# Patient Record
Sex: MALE | Race: WHITE | NOT HISPANIC OR LATINO | Employment: OTHER | URBAN - METROPOLITAN AREA
[De-identification: names, ages, dates, MRNs, and addresses within clinical notes are randomized per-mention and may not be internally consistent; named-entity substitution may affect disease eponyms.]

---
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
## 2022-06-02 ENCOUNTER — HOSPITAL ENCOUNTER (INPATIENT)
Facility: CLINIC | Age: 22
LOS: 1 days | Discharge: HOME | End: 2022-06-03
Attending: EMERGENCY MEDICINE | Admitting: STUDENT IN AN ORGANIZED HEALTH CARE EDUCATION/TRAINING PROGRAM
Payer: COMMERCIAL

## 2022-06-02 ENCOUNTER — APPOINTMENT (OUTPATIENT)
Dept: GENERAL RADIOLOGY | Facility: CLINIC | Age: 22
End: 2022-06-02
Attending: EMERGENCY MEDICINE
Payer: COMMERCIAL

## 2022-06-02 DIAGNOSIS — E10.10 DIABETIC KETOACIDOSIS WITHOUT COMA ASSOCIATED WITH TYPE 1 DIABETES MELLITUS (H): ICD-10-CM

## 2022-06-02 DIAGNOSIS — R07.9 CHEST PAIN, UNSPECIFIED TYPE: ICD-10-CM

## 2022-06-02 LAB
ALBUMIN SERPL-MCNC: 5 G/DL (ref 3.4–5)
ALBUMIN UR-MCNC: 30 MG/DL
ALP SERPL-CCNC: 252 U/L (ref 65–260)
ALT SERPL W P-5'-P-CCNC: 32 U/L (ref 0–50)
ANION GAP SERPL CALCULATED.3IONS-SCNC: 12 MMOL/L (ref 3–14)
ANION GAP SERPL CALCULATED.3IONS-SCNC: 20 MMOL/L (ref 3–14)
ANION GAP SERPL CALCULATED.3IONS-SCNC: 6 MMOL/L (ref 3–14)
ANION GAP SERPL CALCULATED.3IONS-SCNC: 7 MMOL/L (ref 3–14)
APPEARANCE UR: CLEAR
AST SERPL W P-5'-P-CCNC: 18 U/L (ref 0–35)
ATRIAL RATE - MUSE: 68 BPM
BASOPHILS # BLD AUTO: 0.1 10E3/UL (ref 0–0.2)
BASOPHILS # BLD AUTO: 0.1 10E3/UL (ref 0–0.2)
BASOPHILS NFR BLD AUTO: 0 %
BASOPHILS NFR BLD AUTO: 0 %
BILIRUB SERPL-MCNC: 0.8 MG/DL (ref 0.2–1.3)
BILIRUB UR QL STRIP: NEGATIVE
BUN SERPL-MCNC: 16 MG/DL (ref 7–30)
BUN SERPL-MCNC: 16 MG/DL (ref 7–30)
BUN SERPL-MCNC: 17 MG/DL (ref 7–30)
BUN SERPL-MCNC: 23 MG/DL (ref 7–30)
CALCIUM SERPL-MCNC: 8.6 MG/DL (ref 8.5–10.1)
CALCIUM SERPL-MCNC: 8.7 MG/DL (ref 8.5–10.1)
CALCIUM SERPL-MCNC: 9 MG/DL (ref 8.5–10.1)
CALCIUM SERPL-MCNC: 9.9 MG/DL (ref 8.5–10.1)
CHLORIDE BLD-SCNC: 101 MMOL/L (ref 98–110)
CHLORIDE BLD-SCNC: 103 MMOL/L (ref 98–110)
CHLORIDE BLD-SCNC: 103 MMOL/L (ref 98–110)
CHLORIDE BLD-SCNC: 97 MMOL/L (ref 98–110)
CO2 SERPL-SCNC: 19 MMOL/L (ref 20–32)
CO2 SERPL-SCNC: 22 MMOL/L (ref 20–32)
CO2 SERPL-SCNC: 24 MMOL/L (ref 20–32)
CO2 SERPL-SCNC: 28 MMOL/L (ref 20–32)
COLOR UR AUTO: ABNORMAL
CREAT SERPL-MCNC: 0.65 MG/DL (ref 0.5–1)
CREAT SERPL-MCNC: 0.72 MG/DL (ref 0.5–1)
CREAT SERPL-MCNC: 0.72 MG/DL (ref 0.5–1)
CREAT SERPL-MCNC: 0.92 MG/DL (ref 0.5–1)
DIASTOLIC BLOOD PRESSURE - MUSE: NORMAL MMHG
EOSINOPHIL # BLD AUTO: 0 10E3/UL (ref 0–0.7)
EOSINOPHIL # BLD AUTO: 0 10E3/UL (ref 0–0.7)
EOSINOPHIL NFR BLD AUTO: 0 %
EOSINOPHIL NFR BLD AUTO: 0 %
ERYTHROCYTE [DISTWIDTH] IN BLOOD BY AUTOMATED COUNT: 11.7 % (ref 10–15)
ERYTHROCYTE [DISTWIDTH] IN BLOOD BY AUTOMATED COUNT: 11.8 % (ref 10–15)
GFR SERPL CREATININE-BSD FRML MDRD: >90 ML/MIN/1.73M2
GLUCOSE BLD-MCNC: 136 MG/DL (ref 70–99)
GLUCOSE BLD-MCNC: 234 MG/DL (ref 70–99)
GLUCOSE BLD-MCNC: 253 MG/DL (ref 70–99)
GLUCOSE BLD-MCNC: 479 MG/DL (ref 70–99)
GLUCOSE BLDC GLUCOMTR-MCNC: 135 MG/DL (ref 70–99)
GLUCOSE BLDC GLUCOMTR-MCNC: 140 MG/DL (ref 70–99)
GLUCOSE BLDC GLUCOMTR-MCNC: 143 MG/DL (ref 70–99)
GLUCOSE BLDC GLUCOMTR-MCNC: 151 MG/DL (ref 70–99)
GLUCOSE BLDC GLUCOMTR-MCNC: 169 MG/DL (ref 70–99)
GLUCOSE BLDC GLUCOMTR-MCNC: 177 MG/DL (ref 70–99)
GLUCOSE BLDC GLUCOMTR-MCNC: 210 MG/DL (ref 70–99)
GLUCOSE BLDC GLUCOMTR-MCNC: 227 MG/DL (ref 70–99)
GLUCOSE BLDC GLUCOMTR-MCNC: 230 MG/DL (ref 70–99)
GLUCOSE BLDC GLUCOMTR-MCNC: 235 MG/DL (ref 70–99)
GLUCOSE BLDC GLUCOMTR-MCNC: 239 MG/DL (ref 70–99)
GLUCOSE BLDC GLUCOMTR-MCNC: 258 MG/DL (ref 70–99)
GLUCOSE BLDC GLUCOMTR-MCNC: 277 MG/DL (ref 70–99)
GLUCOSE BLDC GLUCOMTR-MCNC: 278 MG/DL (ref 70–99)
GLUCOSE BLDC GLUCOMTR-MCNC: 315 MG/DL (ref 70–99)
GLUCOSE BLDC GLUCOMTR-MCNC: 331 MG/DL (ref 70–99)
GLUCOSE BLDC GLUCOMTR-MCNC: 436 MG/DL (ref 70–99)
GLUCOSE UR STRIP-MCNC: >=1000 MG/DL
HBA1C MFR BLD: 10.2 % (ref 0–5.6)
HCO3 BLDV-SCNC: 20 MMOL/L (ref 21–28)
HCT VFR BLD AUTO: 42.3 % (ref 40–53)
HCT VFR BLD AUTO: 49.4 % (ref 40–53)
HGB BLD-MCNC: 14.4 G/DL (ref 13.3–17.7)
HGB BLD-MCNC: 16.5 G/DL (ref 13.3–17.7)
HGB UR QL STRIP: NEGATIVE
HOLD SPECIMEN: NORMAL
HOLD SPECIMEN: NORMAL
IMM GRANULOCYTES # BLD: 0.1 10E3/UL
IMM GRANULOCYTES # BLD: 0.1 10E3/UL
IMM GRANULOCYTES NFR BLD: 0 %
IMM GRANULOCYTES NFR BLD: 1 %
INTERPRETATION ECG - MUSE: NORMAL
KETONES BLD-SCNC: 1 MMOL/L (ref 0–0.6)
KETONES BLD-SCNC: 2.3 MMOL/L (ref 0–0.6)
KETONES BLD-SCNC: 4.3 MMOL/L (ref 0–0.6)
KETONES BLD-SCNC: 6.1 MMOL/L (ref 0–0.6)
KETONES UR STRIP-MCNC: >150 MG/DL
LACTATE BLD-SCNC: 1.5 MMOL/L
LEUKOCYTE ESTERASE UR QL STRIP: NEGATIVE
LYMPHOCYTES # BLD AUTO: 1.5 10E3/UL (ref 0.8–5.3)
LYMPHOCYTES # BLD AUTO: 2 10E3/UL (ref 0.8–5.3)
LYMPHOCYTES NFR BLD AUTO: 16 %
LYMPHOCYTES NFR BLD AUTO: 9 %
MAGNESIUM SERPL-MCNC: 1.9 MG/DL (ref 1.6–2.3)
MAGNESIUM SERPL-MCNC: 2.5 MG/DL (ref 1.6–2.3)
MCH RBC QN AUTO: 29.6 PG (ref 26.5–33)
MCH RBC QN AUTO: 30 PG (ref 26.5–33)
MCHC RBC AUTO-ENTMCNC: 33.4 G/DL (ref 31.5–36.5)
MCHC RBC AUTO-ENTMCNC: 34 G/DL (ref 31.5–36.5)
MCV RBC AUTO: 87 FL (ref 78–100)
MCV RBC AUTO: 90 FL (ref 78–100)
MONOCYTES # BLD AUTO: 0.9 10E3/UL (ref 0–1.3)
MONOCYTES # BLD AUTO: 0.9 10E3/UL (ref 0–1.3)
MONOCYTES NFR BLD AUTO: 5 %
MONOCYTES NFR BLD AUTO: 7 %
MUCOUS THREADS #/AREA URNS LPF: PRESENT /LPF
NEUTROPHILS # BLD AUTO: 15.4 10E3/UL (ref 1.6–8.3)
NEUTROPHILS # BLD AUTO: 9.6 10E3/UL (ref 1.6–8.3)
NEUTROPHILS NFR BLD AUTO: 77 %
NEUTROPHILS NFR BLD AUTO: 85 %
NITRATE UR QL: NEGATIVE
NRBC # BLD AUTO: 0 10E3/UL
NRBC # BLD AUTO: 0 10E3/UL
NRBC BLD AUTO-RTO: 0 /100
NRBC BLD AUTO-RTO: 0 /100
OSMOLALITY SERPL: 302 MMOL/KG (ref 275–295)
P AXIS - MUSE: 39 DEGREES
PCO2 BLDV: 48 MM HG (ref 40–50)
PH BLDV: 7.22 [PH] (ref 7.32–7.43)
PH UR STRIP: 5.5 [PH] (ref 5–7)
PHOSPHATE SERPL-MCNC: 5.6 MG/DL (ref 2.5–4.5)
PLATELET # BLD AUTO: 297 10E3/UL (ref 150–450)
PLATELET # BLD AUTO: 361 10E3/UL (ref 150–450)
PO2 BLDV: 44 MM HG (ref 25–47)
POTASSIUM BLD-SCNC: 3.6 MMOL/L (ref 3.4–5.3)
POTASSIUM BLD-SCNC: 3.9 MMOL/L (ref 3.4–5.3)
POTASSIUM BLD-SCNC: 4.2 MMOL/L (ref 3.4–5.3)
POTASSIUM BLD-SCNC: 4.3 MMOL/L (ref 3.4–5.3)
POTASSIUM BLD-SCNC: 4.4 MMOL/L (ref 3.4–5.3)
PR INTERVAL - MUSE: 162 MS
PROT SERPL-MCNC: 8.5 G/DL (ref 6.8–8.8)
QRS DURATION - MUSE: 96 MS
QT - MUSE: 406 MS
QTC - MUSE: 431 MS
R AXIS - MUSE: 86 DEGREES
RBC # BLD AUTO: 4.86 10E6/UL (ref 4.4–5.9)
RBC # BLD AUTO: 5.5 10E6/UL (ref 4.4–5.9)
RBC URINE: 0 /HPF
SAO2 % BLDV: 70 % (ref 94–100)
SARS-COV-2 RNA RESP QL NAA+PROBE: NEGATIVE
SODIUM SERPL-SCNC: 134 MMOL/L (ref 133–144)
SODIUM SERPL-SCNC: 135 MMOL/L (ref 133–144)
SODIUM SERPL-SCNC: 136 MMOL/L (ref 133–144)
SODIUM SERPL-SCNC: 137 MMOL/L (ref 133–144)
SP GR UR STRIP: 1.03 (ref 1–1.03)
SQUAMOUS EPITHELIAL: <1 /HPF
SYSTOLIC BLOOD PRESSURE - MUSE: NORMAL MMHG
T AXIS - MUSE: 42 DEGREES
TROPONIN I SERPL HS-MCNC: <3 NG/L
UROBILINOGEN UR STRIP-MCNC: NORMAL MG/DL
VENTRICULAR RATE- MUSE: 68 BPM
WBC # BLD AUTO: 12.5 10E3/UL (ref 4–11)
WBC # BLD AUTO: 17.9 10E3/UL (ref 4–11)
WBC URINE: 1 /HPF

## 2022-06-02 PROCEDURE — U0003 INFECTIOUS AGENT DETECTION BY NUCLEIC ACID (DNA OR RNA); SEVERE ACUTE RESPIRATORY SYNDROME CORONAVIRUS 2 (SARS-COV-2) (CORONAVIRUS DISEASE [COVID-19]), AMPLIFIED PROBE TECHNIQUE, MAKING USE OF HIGH THROUGHPUT TECHNOLOGIES AS DESCRIBED BY CMS-2020-01-R: HCPCS | Performed by: EMERGENCY MEDICINE

## 2022-06-02 PROCEDURE — 258N000003 HC RX IP 258 OP 636: Performed by: NURSE PRACTITIONER

## 2022-06-02 PROCEDURE — 250N000009 HC RX 250: Performed by: NURSE PRACTITIONER

## 2022-06-02 PROCEDURE — 82803 BLOOD GASES ANY COMBINATION: CPT

## 2022-06-02 PROCEDURE — 250N000009 HC RX 250: Performed by: EMERGENCY MEDICINE

## 2022-06-02 PROCEDURE — 82010 KETONE BODYS QUAN: CPT | Performed by: EMERGENCY MEDICINE

## 2022-06-02 PROCEDURE — 83735 ASSAY OF MAGNESIUM: CPT | Performed by: NURSE PRACTITIONER

## 2022-06-02 PROCEDURE — 250N000011 HC RX IP 250 OP 636: Performed by: EMERGENCY MEDICINE

## 2022-06-02 PROCEDURE — 120N000013 HC R&B IMCU

## 2022-06-02 PROCEDURE — 80053 COMPREHEN METABOLIC PANEL: CPT | Performed by: EMERGENCY MEDICINE

## 2022-06-02 PROCEDURE — 84484 ASSAY OF TROPONIN QUANT: CPT | Performed by: EMERGENCY MEDICINE

## 2022-06-02 PROCEDURE — 96366 THER/PROPH/DIAG IV INF ADDON: CPT

## 2022-06-02 PROCEDURE — C9803 HOPD COVID-19 SPEC COLLECT: HCPCS

## 2022-06-02 PROCEDURE — 83735 ASSAY OF MAGNESIUM: CPT | Performed by: EMERGENCY MEDICINE

## 2022-06-02 PROCEDURE — 258N000003 HC RX IP 258 OP 636: Performed by: EMERGENCY MEDICINE

## 2022-06-02 PROCEDURE — 82010 KETONE BODYS QUAN: CPT | Performed by: NURSE PRACTITIONER

## 2022-06-02 PROCEDURE — 99223 1ST HOSP IP/OBS HIGH 75: CPT | Mod: AI | Performed by: NURSE PRACTITIONER

## 2022-06-02 PROCEDURE — 81001 URINALYSIS AUTO W/SCOPE: CPT | Performed by: NURSE PRACTITIONER

## 2022-06-02 PROCEDURE — 96365 THER/PROPH/DIAG IV INF INIT: CPT | Mod: 59

## 2022-06-02 PROCEDURE — 84132 ASSAY OF SERUM POTASSIUM: CPT | Performed by: NURSE PRACTITIONER

## 2022-06-02 PROCEDURE — 99285 EMERGENCY DEPT VISIT HI MDM: CPT | Mod: 25

## 2022-06-02 PROCEDURE — 84100 ASSAY OF PHOSPHORUS: CPT | Performed by: EMERGENCY MEDICINE

## 2022-06-02 PROCEDURE — 85004 AUTOMATED DIFF WBC COUNT: CPT | Performed by: NURSE PRACTITIONER

## 2022-06-02 PROCEDURE — 87040 BLOOD CULTURE FOR BACTERIA: CPT | Performed by: NURSE PRACTITIONER

## 2022-06-02 PROCEDURE — 84132 ASSAY OF SERUM POTASSIUM: CPT | Performed by: EMERGENCY MEDICINE

## 2022-06-02 PROCEDURE — 36415 COLL VENOUS BLD VENIPUNCTURE: CPT | Performed by: NURSE PRACTITIONER

## 2022-06-02 PROCEDURE — 36415 COLL VENOUS BLD VENIPUNCTURE: CPT | Performed by: EMERGENCY MEDICINE

## 2022-06-02 PROCEDURE — 96375 TX/PRO/DX INJ NEW DRUG ADDON: CPT

## 2022-06-02 PROCEDURE — 83605 ASSAY OF LACTIC ACID: CPT

## 2022-06-02 PROCEDURE — 82040 ASSAY OF SERUM ALBUMIN: CPT | Performed by: EMERGENCY MEDICINE

## 2022-06-02 PROCEDURE — 93005 ELECTROCARDIOGRAM TRACING: CPT

## 2022-06-02 PROCEDURE — 83036 HEMOGLOBIN GLYCOSYLATED A1C: CPT | Performed by: EMERGENCY MEDICINE

## 2022-06-02 PROCEDURE — 83930 ASSAY OF BLOOD OSMOLALITY: CPT | Performed by: NURSE PRACTITIONER

## 2022-06-02 PROCEDURE — 85014 HEMATOCRIT: CPT | Performed by: EMERGENCY MEDICINE

## 2022-06-02 PROCEDURE — 71046 X-RAY EXAM CHEST 2 VIEWS: CPT

## 2022-06-02 RX ORDER — DEXTROSE MONOHYDRATE 25 G/50ML
25-50 INJECTION, SOLUTION INTRAVENOUS
Status: DISCONTINUED | OUTPATIENT
Start: 2022-06-02 | End: 2022-06-03

## 2022-06-02 RX ORDER — SODIUM CHLORIDE AND POTASSIUM CHLORIDE 150; 450 MG/100ML; MG/100ML
INJECTION, SOLUTION INTRAVENOUS CONTINUOUS
Status: DISCONTINUED | OUTPATIENT
Start: 2022-06-02 | End: 2022-06-03

## 2022-06-02 RX ORDER — NICOTINE POLACRILEX 4 MG
15-30 LOZENGE BUCCAL
Status: DISCONTINUED | OUTPATIENT
Start: 2022-06-02 | End: 2022-06-03

## 2022-06-02 RX ORDER — POTASSIUM CHLORIDE 7.45 MG/ML
10 INJECTION INTRAVENOUS ONCE
Status: COMPLETED | OUTPATIENT
Start: 2022-06-02 | End: 2022-06-02

## 2022-06-02 RX ORDER — DEXTROSE MONOHYDRATE, SODIUM CHLORIDE, AND POTASSIUM CHLORIDE 50; 1.49; 4.5 G/1000ML; G/1000ML; G/1000ML
INJECTION, SOLUTION INTRAVENOUS CONTINUOUS
Status: DISCONTINUED | OUTPATIENT
Start: 2022-06-02 | End: 2022-06-03

## 2022-06-02 RX ORDER — ONDANSETRON 2 MG/ML
8 INJECTION INTRAMUSCULAR; INTRAVENOUS ONCE
Status: COMPLETED | OUTPATIENT
Start: 2022-06-02 | End: 2022-06-02

## 2022-06-02 RX ADMIN — Medication 0.5 UNITS/HR: at 13:40

## 2022-06-02 RX ADMIN — POTASSIUM CHLORIDE 10 MEQ: 7.46 INJECTION, SOLUTION INTRAVENOUS at 08:18

## 2022-06-02 RX ADMIN — SODIUM CHLORIDE, POTASSIUM CHLORIDE, SODIUM LACTATE AND CALCIUM CHLORIDE 1000 ML: 600; 310; 30; 20 INJECTION, SOLUTION INTRAVENOUS at 08:12

## 2022-06-02 RX ADMIN — Medication: at 21:58

## 2022-06-02 RX ADMIN — SODIUM CHLORIDE, POTASSIUM CHLORIDE, SODIUM LACTATE AND CALCIUM CHLORIDE 1000 ML: 600; 310; 30; 20 INJECTION, SOLUTION INTRAVENOUS at 08:24

## 2022-06-02 RX ADMIN — Medication 4.5 UNITS/HR: at 08:47

## 2022-06-02 RX ADMIN — ONDANSETRON 8 MG: 2 INJECTION INTRAMUSCULAR; INTRAVENOUS at 08:28

## 2022-06-02 RX ADMIN — POTASSIUM CHLORIDE, DEXTROSE MONOHYDRATE AND SODIUM CHLORIDE: 150; 5; 450 INJECTION, SOLUTION INTRAVENOUS at 14:16

## 2022-06-02 ASSESSMENT — ENCOUNTER SYMPTOMS
DIARRHEA: 0
SHORTNESS OF BREATH: 1
ABDOMINAL PAIN: 1
COUGH: 0
NAUSEA: 1

## 2022-06-02 ASSESSMENT — ACTIVITIES OF DAILY LIVING (ADL)
ADLS_ACUITY_SCORE: 35

## 2022-06-02 NOTE — ED TRIAGE NOTES
"  Pt began vomiting last night with abdominal pain, CP and SOB. Type 1 DM. Hx DKA, has had to be hospitalized in the past. Glucose 350-400 on home pump, ketone strips at home \"at highest level.\" ABCs intact.      "

## 2022-06-02 NOTE — H&P
[facility]    History and Physical - Hospitalist Service       Date of Admission:  6/2/2022    Assessment & Plan   Pawel Alston is a 21 year old male with a past medical history of type 1 diabetes, who uses an insulin pump, and is followed by Dr. Qureshi with [facility] who presents to the emergency department for evaluation of abdominal pain, nausea and vomiting.  He was in his usual state of health until this morning at 0100 he woke with nausea, epigastric abdominal pain and upper chest pain/shortness of breath.  He describes the pain to be a burning sensation and is constant in duration.  Pain is worsened with movement and is relieved with rest.  When he woke up this morning at 0600 hrs. he he noted that his battery on the insulin pump was no longer functioning (last known working at 2130 hrs. on 6/1/2022).  He obtained a fingerstick glucose and it read high blood sugar.  He took a ketone test that too was high.  He called his primary provider who recommended they come into the emergency department because the issue was too critical to ignore or try to manage at home.     Acute medical issues:  DKA  Chest/Abdominal pain  Nausea/vomiting  T1DM, poorly controlled (HgA1C 10.2):  Tobacco use disorder  THC use    Plan:  -- [place] [facility] [place]  -- Insulin gtt.  IVF hydration. Replace elytes  -- Diabetic education/reinforcement  -- Check UA. CXR reassuring.  Etiology -- equipment failure and poor attendance to equipment (long standing history -- he should be charging the battery when showering at night).  Does not appear to have an infectious etiology.  -- Pain management and antiemetics.    -- Serial labs Q4hrs.   -- He vapes.  Offered nicotine replacement.  He declines.  Advised non-smoking status.  Endorses recreational use of THC.  Recommend cessation.         Diet:  NPO for now. IVF. Replace elytes.  DVT Prophylaxis: Mechanical   Jolly Catheter:  Not present  Central Lines: None  Cardiac Monitoring: None  Code Status:   Full       Disposition Plan   Expected Discharge: pending clinical course         The patient's care was discussed with the Attending Physician, Dr. Hoyos, Bedside Nurse, Patient, Patient's Family and EM Team.    [facility]  Hospitalist Service  [facility]  Securely message with the Vocera Web Console (learn more here)  Text page via shopa Paging/Directory         ______________________________________________________________________    Chief Complaint   Abdominal pain    History is obtained from the patient  Collateral information is obtained from the emergency medicine service, the EMR, and his mother.    History of Present Illness   Pawel Alston is a 21 year old male with a past medical history of type 1 diabetes, who uses an insulin pump, and is followed by Dr. Qureshi with [facility] who presents to the emergency department for evaluation of abdominal pain, nausea and vomiting.  He was in his usual state of health until this morning at 0100 he woke with nausea, epigastric abdominal pain and upper chest pain/shortness of breath.  He describes the pain to be a burning sensation and is constant in duration.  Pain is worsened with movement and is relieved with rest.  When he woke up this morning at 0600 hrs. he he noted that his battery on the insulin pump was no longer functioning (last known working at 2130 hrs. on 6/1/2022).  He obtained a fingerstick glucose and it read high blood sugar.  He took a ketone test that 2 was high.  He called his primary provider who recommended they come into the emergency department because the issue was too critical to ignore or try to manage at home.  Since the pain has started his symptoms have improved.  He denies any fever, cough, diarrhea, hematochezia, hematemesis or hemoptysis.  No recent travel.  No known exposure to sick  contacts.  No recent illness.    COVID status: COVID PCR is negative.    ED course: IV, IV fluids, labs, imaging, and treatment.  Pertinent findings: Beta hydroxybutyrate of 6.1, lactic acid of 1.5, bicarbonate of 20, and venous pH of 7.22.  Leukocytosis with a white count of 17.9 thousand, hemoglobin of 16.5 platelets of 361,000.  Electrolyte panel was reassuring.  Glucose of 479.  LFTs are normal.  Phosphorus and magnesium were mildly elevated.  A1c of 10.2 (recently was 11 and several months ago as high as 14).  COVID PCR is negative.  Urinalysis is pending.  Checks x-ray was reviewed and no airspace disease or trauma is noted.  Pertinent treatment: 2 L of LR, Zofran, potassium chloride 10 mg, and insulin drip.    Baseline insulin pump: Basal rate 1 unit/h regular insulin total of 24 units.  Carb ratio: 12 g, correction factor: 50 mg/dL, blood glucose target: 12  mg/dL, 7  mg/dL; active insulin time: 3 hours.    Review of Systems    12 point ROS as above. Otherwise negative.     Past Medical History    I have reviewed this patient's medical history and updated it with pertinent information if needed.   Past Medical History:   Diagnosis Date     Type 1 diabetes mellitus (H)     dx at age 9, uses insulin pump.  Dr Qureshi at [facility] is primary Endo   He has required hospitalization 1 other time for DKA.  Has never required intubation.  Attention deficit disorder    Past Surgical History   I have reviewed this patient's surgical history and updated it with pertinent information if needed.  Past Surgical History:   Procedure Laterality Date     OPEN REDUCTION INTERNAL FIXATION WRIST Left 11/8/2015    Procedure: OPEN REDUCTION INTERNAL FIXATION WRIST;  Surgeon: Amador Ro MD;  Location:  OR       Social History   I have reviewed this patient's social history and updated it with pertinent information if needed.  EtOH: Denies significant EtOH use.         Family History   Sister with T1DM.  Father  with colon cancer    Prior to Admission Medications   Prior to Admission Medications   Prescriptions Last Dose Informant Patient Reported? Taking?   INSULIN PUMP - OUTPATIENT   Yes No   Sig: Inject 0.65 Units/hr Subcutaneous Date last updated:  11/8/15  Medtronic Minimed: Model   Insulin lispro (Humalog)  BASAL RATES and times:  12   AM (midnight): 0.7 units/hour    0400: 0.75 units/hour  1000: 0.6 units/hour  2000: 0.6 units/hour  Bolus: 1 unit per 10 g CHO Breakfast; 1 unit per 12 g CHO Lunch and Dinner   [facility]link / [employer] username:    [employer] / Yu[employer] Password:   oxyCODONE (ROXICODONE) 5 MG immediate release tablet   No No   Sig: Take 1 tablet (5 mg) by mouth every 4 hours as needed for moderate to severe pain   polyethylene glycol (MIRALAX/GLYCOLAX) packet   No No   Sig: Take 17 g by mouth daily as needed for constipation      Facility-Administered Medications: None     Allergies   No Known Allergies    Physical Exam   Vital Signs: Temp: 97.6  F (36.4  C) Temp src: Temporal BP: 117/79 Pulse: 83   Resp: 18 SpO2: 100 % O2 Device: None (Room air)    Weight: 165 lbs 1.99 oz    General Appearance: Pleasant 21-year-old male no acute distress.  HEENT: Normocephalic atraumatic.  Pupils equal round react to light accommodation.  EOMI.  Oropharynx is clear.  Respiratory: Bilateral breath sounds clear to auscultation.  No adventitious breath sounds are noted.  Cardiovascular: Regular rate and rhythm S1-S2 no S3 or S4.  GI: Soft, mild tenderness.  Normoactive bowel sounds.  No significant rebound.  Lymph/Hematologic: No lymphadenopathy  Genitourinary: Deferred  Skin: Warm dry and intact.  No worrisome lesions..  Musculoskeletal: Moves all extremities.  CMS is intact.  Capillary refill less than 3 seconds.  Neurologic: Alert and oriented x3.  Follows commands.  No focal deficits.  Psychiatric: Calm    Data   Data reviewed today: I reviewed all medications, new labs and imaging results over the last 24 hours. I personally  reviewed     Recent Labs   Lab 06/02/22  1131 06/02/22  1103 06/02/22  1039 06/02/22  0952 06/02/22  0847 06/02/22  0738   WBC  --   --   --   --   --  17.9*   HGB  --   --   --   --   --  16.5   MCV  --   --   --   --   --  90   PLT  --   --   --   --   --  361   NA  --   --   --   --   --  136   POTASSIUM  --   --  4.3  --   --  4.4   CHLORIDE  --   --   --   --   --  97*   CO2  --   --   --   --   --  19*   BUN  --   --   --   --   --  23   CR  --   --   --   --   --  0.92   ANIONGAP  --   --   --   --   --  20*   RERE  --   --   --   --   --  9.9   * 177*  --  278*   < > 479*   ALBUMIN  --   --   --   --   --  5.0   PROTTOTAL  --   --   --   --   --  8.5   BILITOTAL  --   --   --   --   --  0.8   ALKPHOS  --   --   --   --   --  252   ALT  --   --   --   --   --  32   AST  --   --   --   --   --  18    < > = values in this interval not displayed.     Recent Results (from the past 24 hour(s))   Chest XR,  PA & LAT    Narrative    CHEST TWO VIEWS  6/2/2022 8:37 AM     HISTORY: Chest pain.    COMPARISON: None.      Impression    IMPRESSION: No acute cardiopulmonary disease.

## 2022-06-02 NOTE — ED NOTES
[facility]  ED Nurse Handoff Report    Pawel Alston is a 21 year old male   ED Chief complaint: Abdominal Pain  . ED Diagnosis:   Final diagnoses:   Diabetic ketoacidosis without coma associated with type 1 diabetes mellitus (H)   Chest pain, unspecified type     Allergies: No Known Allergies    Code Status: Full Code  Activity level - Baseline/Home:  Independent. Activity Level - Current:   Independent. Lift room needed: No. Bariatric: No   Needed: No   Isolation: No. Infection: Not Applicable.     Vital Signs:   Vitals:    06/02/22 0720 06/02/22 0750 06/02/22 0800 06/02/22 0810   BP: 136/86 117/73 117/79    Pulse: 82  83    Resp: 18      Temp: 97.6  F (36.4  C)      TempSrc: Temporal      SpO2: 99% 100% 100% 100%   Weight: 74.9 kg (165 lb 2 oz)          Cardiac Rhythm:  ,      Pain level:    Patient confused: No. Patient Falls Risk: Yes.   Elimination Status: Not in ED  Patient Report - Initial Complaint: Abdominal Pain. Focused Assessment:  Pawel Alston is a 21 year old male with history of diabetes who presents with abdominal pain. The patient states that this morning at 0100 he woke up with nausea, epigastric abdominal pain, shortness of breath, and pain in his upper chest. He describes this as a burning sensation and constant. He notes the pain is worse with movement and is better but still there while resting. When he woke up this morning at 0600 he found his glucose pump to be out of battery for an unknown amount of time (last known working at 2130 on 6/1). They read a high blood sugar and also a very high ketone test. The patient called his primary doctor who said to come into the ED because the issue was to severe too fix at home.  He notes the symptoms have gotten better since the pain started. He denies any cough, diarrhea, or hemoptysis. He denies a history of blood clots in his legs or lungs. He denies any recent travel.      Review of Systems    Respiratory: Positive for shortness of breath. Negative for cough.    Cardiovascular: Positive for chest pain.   Gastrointestinal: Positive for abdominal pain and nausea. Negative for diarrhea.   All other systems reviewed and are negative.  Tests Performed: EKG, Labs, Imaging . Abnormal Results:   Labs Ordered and Resulted from Time of ED Arrival to Time of ED Departure   COMPREHENSIVE METABOLIC PANEL - Abnormal       Result Value    Sodium 136      Potassium 4.4      Chloride 97 (*)     Carbon Dioxide (CO2) 19 (*)     Anion Gap 20 (*)     Urea Nitrogen 23      Creatinine 0.92      Calcium 9.9      Glucose 479 (*)     Alkaline Phosphatase 252      AST 18      ALT 32      Protein Total 8.5      Albumin 5.0      Bilirubin Total 0.8      GFR Estimate >90     KETONE BETA-HYDROXYBUTYRATE QUANTITATIVE, RAPID - Abnormal    Ketone (Beta-Hydroxybutyrate) Quantitative 6.1 (*)    MAGNESIUM - Abnormal    Magnesium 2.5 (*)    GLUCOSE BY METER - Abnormal    GLUCOSE BY METER POCT 436 (*)    CBC WITH PLATELETS AND DIFFERENTIAL - Abnormal    WBC Count 17.9 (*)     RBC Count 5.50      Hemoglobin 16.5      Hematocrit 49.4      MCV 90      MCH 30.0      MCHC 33.4      RDW 11.7      Platelet Count 361      % Neutrophils 85      % Lymphocytes 9      % Monocytes 5      % Eosinophils 0      % Basophils 0      % Immature Granulocytes 1      NRBCs per 100 WBC 0      Absolute Neutrophils 15.4 (*)     Absolute Lymphocytes 1.5      Absolute Monocytes 0.9      Absolute Eosinophils 0.0      Absolute Basophils 0.1      Absolute Immature Granulocytes 0.1      Absolute NRBCs 0.0     ISTAT GASES LACTATE VENOUS POCT - Abnormal    Lactic Acid POCT 1.5      Bicarbonate Venous POCT 20 (*)     O2 Sat, Venous POCT 70 (*)     pCO2V Venous POCT 48      pH Venous POCT 7.22 (*)     pO2 Venous POCT 44     HEMOGLOBIN A1C - Abnormal    Hemoglobin A1C 10.2 (*)    PHOSPHORUS - Abnormal    Phosphorus 5.6 (*)    GLUCOSE BY METER - Abnormal    GLUCOSE BY METER POCT  331 (*)    TROPONIN I - Normal    Troponin I High Sensitivity <3     GLUCOSE MONITOR NURSING POCT   GLUCOSE MONITOR NURSING POCT   COVID-19 VIRUS (CORONAVIRUS) BY PCR     Chest XR,  PA & LAT   Preliminary Result   IMPRESSION: No acute cardiopulmonary disease.        .   Treatments provided: See MAR   Family Comments: Mother at bedside   OBS brochure/video discussed/provided to patient:  No  ED Medications:   Medications   lactated ringers BOLUS 1,000 mL (1,000 mLs Intravenous New Bag 6/2/22 0812)   dextrose 50 % injection 25-50 mL (has no administration in time range)   potassium chloride 10 mEq in 100 mL sterile water intermittent infusion (premix) (10 mEq Intravenous New Bag 6/2/22 0818)   insulin 1 unit/1 mL in NS (NovoLIN, HumuLIN Regular) drip -ED DKA algorithm (4.5 Units/hr Intravenous New Bag 6/2/22 0847)   lactated ringers BOLUS 1,000 mL (1,000 mLs Intravenous New Bag 6/2/22 0824)   ondansetron (ZOFRAN) injection 8 mg (8 mg Intravenous Given 6/2/22 0828)     Drips infusing:  Yes  For the majority of the shift, the patient's behavior Green. Interventions performed were N/A.    Sepsis treatment initiated: No     Patient tested for COVID 19 prior to admission: YES    ED Nurse Name/Phone Number: Alcira Carcamo RN,   9:09 AM  RECEIVING UNIT ED HANDOFF REVIEW    Above ED Nurse Handoff Report was reviewed: Yes  Reviewed by: Marlyn Perry RN on June 2, 2022 at 12:18 PM

## 2022-06-02 NOTE — PHARMACY-ADMISSION MEDICATION HISTORY
Admission medication history interview status for this patient is complete. See [facility] admission navigator for allergy information, prior to admission medications and immunization status.     Medication history interview done, indicate source(s): Patient & mother  Medication history resources (including written lists, pill bottles, clinic record): [facility] endocrinology note 5/25/22 in care everywhere  Pharmacy: [facility]    Changes made to PTA medication list:  Added: none  Changed: insulin pump settings  Reported as Not Taking: none  Removed: oxycodone, miralax    Actions taken by pharmacist (provider contacted, etc):None     Additional medication history information:    **Unable to find current pump basal settings in physical pump. Updated list per endocrinology note on 5/25/22. Pump is currently attached but paused.         Medication reconciliation/reorder completed by provider prior to medication history?  N   (Y/N)     For patients on insulin therapy:   See insulin pump settings.  Would refer to last endocrinology note from [facility] on 5/25/22.       Prior to Admission medications    Medication Sig Last Dose Taking? Auth Provider   INSULIN PUMP - OUTPATIENT Date last updated:  6/2/22  Current Diabetes Medications: NovoLog via TandemX2 insulin pump  Basal rates: 1 unit per hour x 24 hours (total 24 units)  Carb ratio: 12 grams  Correction factor: 50 mg/dL  BG target: 12 a.m. 140 mg/dL and 7 a.m. 130 mg/dL mg/dL  Active insulin time: 3 hours 6/2/2022 at [facility], paused Yes Unknown, Entered By History

## 2022-06-02 NOTE — ED PROVIDER NOTES
History   Chief Complaint:  Abdominal Pain       HPI   Pawel Alston is a 21 year old male with history of diabetes who presents with abdominal pain. The patient states that this morning at 0100 he woke up with nausea, epigastric abdominal pain, shortness of breath, and pain in his upper chest. He describes this as a burning sensation and constant. He notes the pain is worse with movement and is better but still there while resting. When he woke up this morning at 0600 he found his glucose pump to be out of battery for an unknown amount of time (last known working at 2130 on 6/1). They read a high blood sugar and also a very high ketone test. The patient called his primary doctor who said to come into the ED because the issue was to severe too fix at home.  He notes the symptoms have gotten better since the pain started. He denies any cough, diarrhea, or hemoptysis. He denies a history of blood clots in his legs or lungs. He denies any recent travel.     Review of Systems   Respiratory: Positive for shortness of breath. Negative for cough.    Cardiovascular: Positive for chest pain.   Gastrointestinal: Positive for abdominal pain and nausea. Negative for diarrhea.   All other systems reviewed and are negative.    Allergies:  The patient has no known allergies.     Medications:  oxyCODONE  polyethylene glycol     Past Medical History:     Diabetes  ADD  Lipohypertrophy  DKA    Past Surgical History:    Open reduction internal fixation wrist     Family History:    Father-Colon cancer  Sister-Diabetes    Social History:  The patient presents to the ED with his mom.  The patient works in [employer].    Physical Exam     Patient Vitals for the past 24 hrs:   BP Temp Temp src Pulse Resp SpO2 Weight   06/02/22 0910 -- -- -- 62 18 99 % --   06/02/22 0900 119/62 -- -- 77 19 100 % --   06/02/22 0810 -- -- -- -- -- 100 % --   06/02/22 0800 117/79 -- -- 83 -- 100 % --   06/02/22 0750 117/73 -- -- -- -- 100 % --    06/02/22 0720 136/86 97.6  F (36.4  C) Temporal 82 18 99 % 74.9 kg (165 lb 2 oz)       Physical Exam    Eyes:    Conjunctiva normal  Neck:    Supple, no meningismus.     CV:     Regular rate and rhythm.      No murmurs, rubs or gallops.       No unilateral leg swelling.       2+ radial pulses bilateral.       No lower extremity edema.  PULM:    Clear to auscultation bilateral.       No respiratory distress.      Good air exchange.     No rales or wheezing.  ABD:    Soft, non-tender, non-distended.       No pulsatile masses.       No rebound, guarding or rigidity.  MSK:     No gross deformity to all four extremities.   LYMPH:   No cervical lymphadenopathy.  NEURO:   Alert. Good muscle tone, no atrophy.  Skin:    Warm, dry and intact.    Psych:    Mood is good and affect is appropriate.        Emergency Department Course   ECG  This ECG was taken at 0746, and signed at 0751  Sinus rhythm with marked sinus arrhythmia  Otherwise normal ECG  Vent. rate 68, OR interval 162, QRS Duration 96, QT/QTc 406/431, P-R-T axes 39 86 42      Imaging:  Chest XR,  PA & LAT   Preliminary Result   IMPRESSION: No acute cardiopulmonary disease.        Report per radiology    Laboratory:  Labs Ordered and Resulted from Time of ED Arrival to Time of ED Departure   COMPREHENSIVE METABOLIC PANEL - Abnormal       Result Value    Sodium 136      Potassium 4.4      Chloride 97 (*)     Carbon Dioxide (CO2) 19 (*)     Anion Gap 20 (*)     Urea Nitrogen 23      Creatinine 0.92      Calcium 9.9      Glucose 479 (*)     Alkaline Phosphatase 252      AST 18      ALT 32      Protein Total 8.5      Albumin 5.0      Bilirubin Total 0.8      GFR Estimate >90     KETONE BETA-HYDROXYBUTYRATE QUANTITATIVE, RAPID - Abnormal    Ketone (Beta-Hydroxybutyrate) Quantitative 6.1 (*)    MAGNESIUM - Abnormal    Magnesium 2.5 (*)    GLUCOSE BY METER - Abnormal    GLUCOSE BY METER POCT 436 (*)    CBC WITH PLATELETS AND DIFFERENTIAL - Abnormal    WBC Count 17.9 (*)      RBC Count 5.50      Hemoglobin 16.5      Hematocrit 49.4      MCV 90      MCH 30.0      MCHC 33.4      RDW 11.7      Platelet Count 361      % Neutrophils 85      % Lymphocytes 9      % Monocytes 5      % Eosinophils 0      % Basophils 0      % Immature Granulocytes 1      NRBCs per 100 WBC 0      Absolute Neutrophils 15.4 (*)     Absolute Lymphocytes 1.5      Absolute Monocytes 0.9      Absolute Eosinophils 0.0      Absolute Basophils 0.1      Absolute Immature Granulocytes 0.1      Absolute NRBCs 0.0     ISTAT GASES LACTATE VENOUS POCT - Abnormal    Lactic Acid POCT 1.5      Bicarbonate Venous POCT 20 (*)     O2 Sat, Venous POCT 70 (*)     pCO2V Venous POCT 48      pH Venous POCT 7.22 (*)     pO2 Venous POCT 44     HEMOGLOBIN A1C - Abnormal    Hemoglobin A1C 10.2 (*)    PHOSPHORUS - Abnormal    Phosphorus 5.6 (*)    GLUCOSE BY METER - Abnormal    GLUCOSE BY METER POCT 331 (*)    TROPONIN I - Normal    Troponin I High Sensitivity <3     GLUCOSE MONITOR NURSING POCT   GLUCOSE MONITOR NURSING POCT   COVID-19 VIRUS (CORONAVIRUS) BY PCR      Emergency Department Course:         Reviewed:  I reviewed nursing notes, vitals, past medical history and Care Everywhere    Assessments:  0726 I obtained history and examined the patient as noted above.   0814 I rechecked the patient and explained findings.     Consults:  0836 Talked with Dr Hoyos about patients symptoms and admittance.    Interventions:  0812 Bolus 1000mL IV  0818 Potassium chloride 10mEq IV  0824 Bolus 1000mL IV  0828 Zofran 8mg IV    Disposition:  The patient was admitted to the hospital under the care of Dr. Hoyos.     Impression & Plan       Medical Decision Makin-year-old male presents the ED with chest pain, epigastric pain and nausea/vomiting after his insulin pump ran out of power.  Patient was unfortunately found to have DKA without significant electrolyte abnormality.  Patient was given IV fluids and ultimately started on insulin after return  of basic laboratory studies.  Blood sugars improved with IV fluids alone prior to onset of an insulin infusion.  Patient's native insulin pump was discontinued.  No clinical evidence for associated infection.  Patient will require admission for further treatment of DKA.    In regards to his chest pain, EKG without dysrhythmia or ischemic changes.  Chest x-ray unremarkable.  Troponin within normal limits.  He has no features concerning for pulmonary embolism. I believe chest pain is likely related to DKA and potentially degree of esophagitis from recurrent vomiting this morning.    Diagnosis:    ICD-10-CM    1. Diabetic ketoacidosis without coma associated with type 1 diabetes mellitus (H)  E10.10    2. Chest pain, unspecified type  R07.9        Discharge Medications:  New Prescriptions    No medications on file       Scribe Disclosure:  Ham ZARATE, am serving as a scribe at 7:26 AM on 6/2/2022 to document services personally performed by Alexis Billings MD based on my observations and the provider's statements to me.              Alexis Billings MD  06/02/22 0932

## 2022-06-02 NOTE — PLAN OF CARE
Goal Outcome Evaluation:    Plan of Care Reviewed With: patient, mother     Patient admitted to floor and surroundings.Diabetic with personal insulin pump. Awoke to feeling ill and his pumps batteries were drained. Patient admitted with ketoacidosis and placed on a insulin drip. He has started food but has a sore throat. He is alert, oriented, up independently and mom is at the bedside. Continue POC.

## 2022-06-03 VITALS
TEMPERATURE: 97.6 F | SYSTOLIC BLOOD PRESSURE: 113 MMHG | WEIGHT: 169.6 LBS | HEART RATE: 63 BPM | OXYGEN SATURATION: 100 % | DIASTOLIC BLOOD PRESSURE: 67 MMHG | RESPIRATION RATE: 18 BRPM

## 2022-06-03 LAB
ANION GAP SERPL CALCULATED.3IONS-SCNC: 4 MMOL/L (ref 3–14)
ANION GAP SERPL CALCULATED.3IONS-SCNC: 5 MMOL/L (ref 3–14)
BUN SERPL-MCNC: 12 MG/DL (ref 7–30)
BUN SERPL-MCNC: 15 MG/DL (ref 7–30)
CALCIUM SERPL-MCNC: 8.6 MG/DL (ref 8.5–10.1)
CALCIUM SERPL-MCNC: 8.6 MG/DL (ref 8.5–10.1)
CHLORIDE BLD-SCNC: 105 MMOL/L (ref 98–110)
CHLORIDE BLD-SCNC: 105 MMOL/L (ref 98–110)
CO2 SERPL-SCNC: 27 MMOL/L (ref 20–32)
CO2 SERPL-SCNC: 29 MMOL/L (ref 20–32)
CREAT SERPL-MCNC: 0.67 MG/DL (ref 0.5–1)
CREAT SERPL-MCNC: 0.72 MG/DL (ref 0.5–1)
GFR SERPL CREATININE-BSD FRML MDRD: >90 ML/MIN/1.73M2
GFR SERPL CREATININE-BSD FRML MDRD: >90 ML/MIN/1.73M2
GLUCOSE BLD-MCNC: 232 MG/DL (ref 70–99)
GLUCOSE BLD-MCNC: 234 MG/DL (ref 70–99)
GLUCOSE BLDC GLUCOMTR-MCNC: 116 MG/DL (ref 70–99)
GLUCOSE BLDC GLUCOMTR-MCNC: 141 MG/DL (ref 70–99)
GLUCOSE BLDC GLUCOMTR-MCNC: 160 MG/DL (ref 70–99)
GLUCOSE BLDC GLUCOMTR-MCNC: 168 MG/DL (ref 70–99)
GLUCOSE BLDC GLUCOMTR-MCNC: 171 MG/DL (ref 70–99)
GLUCOSE BLDC GLUCOMTR-MCNC: 185 MG/DL (ref 70–99)
GLUCOSE BLDC GLUCOMTR-MCNC: 191 MG/DL (ref 70–99)
GLUCOSE BLDC GLUCOMTR-MCNC: 203 MG/DL (ref 70–99)
GLUCOSE BLDC GLUCOMTR-MCNC: 205 MG/DL (ref 70–99)
GLUCOSE BLDC GLUCOMTR-MCNC: 222 MG/DL (ref 70–99)
GLUCOSE BLDC GLUCOMTR-MCNC: 229 MG/DL (ref 70–99)
GLUCOSE BLDC GLUCOMTR-MCNC: 230 MG/DL (ref 70–99)
GLUCOSE BLDC GLUCOMTR-MCNC: 254 MG/DL (ref 70–99)
GLUCOSE BLDC GLUCOMTR-MCNC: 296 MG/DL (ref 70–99)
GLUCOSE BLDC GLUCOMTR-MCNC: 335 MG/DL (ref 70–99)
KETONES BLD-SCNC: 0.2 MMOL/L (ref 0–0.6)
KETONES BLD-SCNC: 0.4 MMOL/L (ref 0–0.6)
PHOSPHATE SERPL-MCNC: 2.8 MG/DL (ref 2.5–4.5)
POTASSIUM BLD-SCNC: 3.5 MMOL/L (ref 3.4–5.3)
POTASSIUM BLD-SCNC: 4.1 MMOL/L (ref 3.4–5.3)
SODIUM SERPL-SCNC: 136 MMOL/L (ref 133–144)
SODIUM SERPL-SCNC: 139 MMOL/L (ref 133–144)

## 2022-06-03 PROCEDURE — 80048 BASIC METABOLIC PNL TOTAL CA: CPT | Performed by: NURSE PRACTITIONER

## 2022-06-03 PROCEDURE — 82010 KETONE BODYS QUAN: CPT | Performed by: NURSE PRACTITIONER

## 2022-06-03 PROCEDURE — 84100 ASSAY OF PHOSPHORUS: CPT | Performed by: NURSE PRACTITIONER

## 2022-06-03 PROCEDURE — 250N000009 HC RX 250

## 2022-06-03 PROCEDURE — 36415 COLL VENOUS BLD VENIPUNCTURE: CPT | Performed by: NURSE PRACTITIONER

## 2022-06-03 PROCEDURE — 99239 HOSP IP/OBS DSCHRG MGMT >30: CPT | Performed by: STUDENT IN AN ORGANIZED HEALTH CARE EDUCATION/TRAINING PROGRAM

## 2022-06-03 RX ORDER — DEXTROSE MONOHYDRATE 25 G/50ML
25-50 INJECTION, SOLUTION INTRAVENOUS
Status: DISCONTINUED | OUTPATIENT
Start: 2022-06-03 | End: 2022-06-03 | Stop reason: HOSPADM

## 2022-06-03 RX ORDER — NICOTINE POLACRILEX 4 MG
15-30 LOZENGE BUCCAL
Status: DISCONTINUED | OUTPATIENT
Start: 2022-06-03 | End: 2022-06-03 | Stop reason: HOSPADM

## 2022-06-03 RX ADMIN — Medication: at 09:02

## 2022-06-03 ASSESSMENT — ACTIVITIES OF DAILY LIVING (ADL)
ADLS_ACUITY_SCORE: 35

## 2022-06-03 NOTE — PHARMACY-ADMISSION MEDICATION HISTORY
Addendum to medication history interview.    Spoke with patient and family member to clarify insulin pump settings. After discussion, the pump settings were confirmed to be as listed in the prior to admission medication list.      Prior to Admission medications    Medication Sig Last Dose Taking? Auth Provider   INSULIN PUMP - OUTPATIENT Date last updated:  6/2/22  Current Diabetes Medications: NovoLog via TandemX2 insulin pump  Basal rates: 1 unit per hour x 24 hours (total 24 units)  Carb ratio: 12 grams  Correction factor: 50 mg/dL  BG target: 12 a.m. 140 mg/dL and 7 a.m. 130 mg/dL mg/dL  Active insulin time: 3 hours 6/2/2022 at [facility], paused Yes Unknown, Entered By History

## 2022-06-03 NOTE — DISCHARGE SUMMARY
Pt. Discharged back home with mom. Verbalized understanding of AVS. Personal belongings and AVS sent home with pt.

## 2022-06-03 NOTE — PLAN OF CARE
BP 99/47 (BP Location: Left arm)   Pulse 53   Temp 98.2  F (36.8  C) (Oral)   Resp 16   Wt 76.9 kg (169 lb 9.6 oz)   SpO2 97%     Pt was on insulin gtt currently on 1 unit/hr and IVF running at 125 mL/hr. Tele: SR/SB. Pt is on RA denies pain, nausea/vomiting. Pt is independent in the room. Pt had poor appetite. Continue to monitor.

## 2022-06-03 NOTE — DISCHARGE SUMMARY
[facility]  Discharge Summary  Name: Pawel Alston    MRN: [id number]  YOB: 2000    Age: 21 year old  Date of Discharge:  6/3/2022 12:36 PM  Date of Admission: 2022  Primary Care Provider: Debbie Qureshi  Discharge Physician:  Laith Hoyos DO  Discharging Service:  Hospitalist      Hospital Course  Pawel Alston is a 21 year old male with a past medical history of type 1 diabetes, who uses an insulin pump, and is followed by Dr. Qureshi with [facility] who presents to the emergency department on  for evaluation of abdominal pain, nausea and vomiting.    Work-up in the emergency department was consistent with diabetic ketoacidosis with elevated anion gap of 20, a bicarbonate of 19, elevated ketones 6.1.  VBG was consistent with a pH of 7.2, bicarbonate of 20.  He was initiated on insulin drip and admitted to the hospital given diabetic ketoacidosis.  The patient is diabetic ketoacidosis resolved by the night of admission.  The morning following he was transitioned to his home insulin pump and was discharged home in stable condition.  Last blood check here in the hospital was 335, however his Dexcom was measuring blood glucose prior to discharge and was downtrending in the 200s.    Discharge Diagnoses:  N/V  AGMA  Diabetic ketoacidosis  Type 1 diabetes mellitus, poorly controlled:  Per report, the insulin pump battery  on the night prior to admission.  Patient awoke with glucose meter reading high and ketone check reading high.  Anion gap 20, bicarbonate 19, ketones 6.1, pH 7.2.  Initiated on insulin drip/DKA protocol in the emergency department.  DKA resolved rapidly.  Transitioned to his PTA insulin pump with downtrending glucose readings on discharge.    THC use  Tobacco use disorder: Encourage cessation    Discharge Disposition:  Discharged to home    Allergies:  No Known Allergies     Discharge Medications:        Review  "of your medicines      CONTINUE these medicines which have NOT CHANGED      Dose / Directions   insulin pump infusion      Date last updated:  6/2/22  Current Diabetes Medications: NovoLog via TandemX2 insulin pump  Basal rates: 1 unit per hour x 24 hours (total 24 units)  Carb ratio: 12 grams  Correction factor: 50 mg/dL  BG target: 12 a.m. 140 mg/dL and 7 a.m. 130 mg/dL mg/dL  Active insulin time: 3 hours  Refills: 0            Condition on Discharge:  Discharge condition: Stable       Code status on discharge: Full Code     History of Illness:  See detailed admission note for full details.    Physical Exam:  Vital signs:  Temp: 97.6  F (36.4  C) Temp src: Oral BP: 113/67 Pulse: 63   Resp: 18 SpO2: 100 % O2 Device: None (Room air)     Weight: 76.9 kg (169 lb 9.6 oz)  Estimated body mass index is 18.3 kg/m  as calculated from the following:    Height as of 11/8/15: 1.651 m (5' 5\").    Weight as of 11/7/15: 49.9 kg (110 lb).    Wt Readings from Last 1 Encounters:   06/02/22 76.9 kg (169 lb 9.6 oz) (71 %, Z= 0.55)*     * Growth percentiles are based on CDC (Boys, 2-20 Years) data.     General: Alert, awake, no acute distress.  HEENT: NC/AT, eyes anicteric, external occular movements intact, face symmetric.  Dentition WNL, MM moist.  Cardiac: RRR, S1, S2.  No murmurs appreciated.  Pulmonary: Normal chest rise, normal work of breathing.  Lungs CTA BL  Abdomen: soft, non-tender, non-distended.  Bowel Sounds Present.  No guarding.  Extremities: no deformities.  Warm, well perfused.  Skin: no rashes or lesions noted.  Warm and Dry.  Neuro: No focal deficits noted.  Speech clear.  Coordination and strength grossly normal.  Psych: Appropriate affect.    Procedures other than Imaging:  None     Imaging:  No results found for this or any previous visit (from the past 24 hour(s)).     Consultations:  No consultations were requested during this admission.       Recent Lab Results:  Recent Labs   Lab 06/02/22  1337 " 06/02/22  0738   WBC 12.5* 17.9*   HGB 14.4 16.5   HCT 42.3 49.4   MCV 87 90    361          Lab Results   Component Value Date     06/03/2022     06/03/2022     06/02/2022     11/07/2015    Lab Results   Component Value Date    CHLORIDE 105 06/03/2022    CHLORIDE 105 06/03/2022    CHLORIDE 101 06/02/2022    CHLORIDE 103 11/07/2015    Lab Results   Component Value Date    BUN 12 06/03/2022    BUN 15 06/03/2022    BUN 16 06/02/2022    BUN 13 11/07/2015      Lab Results   Component Value Date    POTASSIUM 4.1 06/03/2022    POTASSIUM 3.5 06/03/2022    POTASSIUM 3.6 06/02/2022    POTASSIUM 3.9 11/07/2015    Lab Results   Component Value Date    CO2 29 06/03/2022    CO2 27 06/03/2022    CO2 28 06/02/2022    CO2 19 11/07/2015    Lab Results   Component Value Date    CR 0.67 06/03/2022    CR 0.72 06/03/2022    CR 0.72 06/02/2022    CR 0.53 11/07/2015             Pending Results:    Unresulted Labs Ordered in the Past 30 Days of this Admission     Date and Time Order Name Status Description    6/2/2022  1:14 PM Blood Culture Arm, Left Preliminary            Discharge Instructions and Follow-Up:   Discharge Procedure Orders   Reason for your hospital stay   Order Comments: You were hospitalized for DKA (Diabetic ketoacidosis). This is a result of high blood sugars. The acid levels increase significantly in your blood, you become extremely dehydrated, and your electrolytes (potassium, magnesium, phosphorus) can become abnormal. You were treated with an insulin drip.     Follow-up and recommended labs and tests    Order Comments: Follow up with primary care provider, JONO SALAS, within 7 days for hospital follow- up.  The following labs/tests are recommended: CBC, BMP.     Activity   Order Comments: Your activity upon discharge: activity as tolerated     Order Specific Question Answer Comments   Is discharge order? Yes      Diet   Order Comments: Follow this diet upon discharge: Regular diet      Order Specific Question Answer Comments   Is discharge order? Yes        Total time spent in face to face contact with the patient and coordinating discharge was:  >30 Minutes.

## 2022-06-03 NOTE — PROGRESS NOTES
Stopped insulin gtt on pt and restarted personal Dexcom pump. BG check with hospital glucometer was 334. Personal pump gave insulin and BG trending down on personal pump. Last check on personal pump was 251. Pt and pt's mother wanting to get discharged ASAP. Writer educated them on the importance of making sure their pump was calibrated correctly and to monitor it to make sure BG is being corrected. MD notified and gave the OK to discharge as long as BG trending down.

## 2022-06-07 LAB — BACTERIA BLD CULT: NO GROWTH
